# Patient Record
Sex: MALE | Race: WHITE | NOT HISPANIC OR LATINO | Employment: OTHER | URBAN - METROPOLITAN AREA
[De-identification: names, ages, dates, MRNs, and addresses within clinical notes are randomized per-mention and may not be internally consistent; named-entity substitution may affect disease eponyms.]

---

## 2019-07-10 ENCOUNTER — TELEPHONE (OUTPATIENT)
Dept: DERMATOLOGY | Facility: CLINIC | Age: 74
End: 2019-07-10

## 2019-08-05 ENCOUNTER — TELEPHONE (OUTPATIENT)
Dept: DERMATOLOGY | Facility: CLINIC | Age: 74
End: 2019-08-05

## 2019-08-05 NOTE — TELEPHONE ENCOUNTER
Telephone Call to pt   LVM to please return my call to scheduled a appointment with Dr Getachew Quinn

## 2019-09-16 ENCOUNTER — OFFICE VISIT (OUTPATIENT)
Dept: DERMATOLOGY | Facility: CLINIC | Age: 74
End: 2019-09-16
Payer: MEDICARE

## 2019-09-16 VITALS — HEIGHT: 68 IN | TEMPERATURE: 98.9 F | WEIGHT: 206 LBS | BODY MASS INDEX: 31.22 KG/M2

## 2019-09-16 DIAGNOSIS — L81.3 CAFE AU LAIT SPOTS: ICD-10-CM

## 2019-09-16 DIAGNOSIS — L57.8 ACTINIC SKIN DAMAGE: Primary | ICD-10-CM

## 2019-09-16 DIAGNOSIS — D22.9 MULTIPLE MELANOCYTIC NEVI: ICD-10-CM

## 2019-09-16 DIAGNOSIS — R21 RASH: ICD-10-CM

## 2019-09-16 DIAGNOSIS — L82.1 SEBORRHEIC KERATOSIS: ICD-10-CM

## 2019-09-16 DIAGNOSIS — D18.01 CHERRY ANGIOMA: ICD-10-CM

## 2019-09-16 PROCEDURE — 88312 SPECIAL STAINS GROUP 1: CPT | Performed by: PATHOLOGY

## 2019-09-16 PROCEDURE — 99204 OFFICE O/P NEW MOD 45 MIN: CPT | Performed by: DERMATOLOGY

## 2019-09-16 PROCEDURE — 88305 TISSUE EXAM BY PATHOLOGIST: CPT | Performed by: PATHOLOGY

## 2019-09-16 PROCEDURE — 11104 PUNCH BX SKIN SINGLE LESION: CPT | Performed by: DERMATOLOGY

## 2019-09-16 RX ORDER — FLUOCINONIDE 0.5 MG/G
OINTMENT TOPICAL
Qty: 60 G | Refills: 0 | Status: SHIPPED | OUTPATIENT
Start: 2019-09-16

## 2019-09-16 RX ORDER — LISINOPRIL 10 MG/1
10 TABLET ORAL DAILY
COMMUNITY

## 2019-09-16 NOTE — PROGRESS NOTES
[address] [facility] Note     Patient Name: Demi Ribera  Encounter Date: 09/16/2019    Today's Chief Concerns:  [employer] Concern #1:  Full Body Exam    Concern #2: brown spots      Past Medical History:  Have you ever had or currently have any of the following medical conditions or treatments? · HIV/AIDS: No  · Hepatitis B: No  · Hepatitis C: No   · Diabetes: No  · Tuberculosis: No  · Biologic Therapy/Chemotherapy: No  · Organ or Bone Marrow Transplantation: No  · Radiation Treatment: No  · Cancer (If Yes, which types)- No      Have you ever had any of the following skin conditions? · Melanoma? (If Yes, please provide more detail)- No  · Basal Cell Carcinoma: No  · Squamous Cell Carcinoma: No  · Sebaceous Cell Carcinoma: No  · Merkel Cell Carcinoma: No  · Angiosarcoma: No  · Blistering Sunburns: YES  · Eczema: YES  · Psoriasis: No    Social History:    What is your current Smoking Status? Never smoker     What is/was your primary occupation? Retired     What are your hobbies/past-times? Spftball    Family history:  Do any of your "first degree relatives" (parent, brother, sister, or child) have any of the following conditions? · Melanoma? (If Yes, which relatives?) No  · Eczema: No  · Asthma: No  · Hay Fever/Seasonal Allergies: No  · Psoriasis: No  · Arthritis: No  · Thyroid Problems: No  · Lupus/Connective Tissue Disease: No  · Diabetes: No  · Stroke: No  · Blood Clots: No  · IBD/Crohn's/Ulcerative Colitis: No  · Vitiligo: No  · Scarring/Keloids: No  · Severe Acne: No  · Pancreatic Cancer: No  · Other known Skin Condition? If Yes, what condition and which relatives? No    Current Medications:    Current Outpatient Medications:     lisinopril (ZESTRIL) 10 mg tablet, Take 10 mg by mouth daily, Disp: , Rfl:     Specific Alerts:    Have you been seen by a [facility] Dermatologist in the last 3 years? No    Are you pregnant or planning to become pregnant?  N/A    Are you currently or planning to be nursing or breast feeding? N/A    No Known Allergies    May we call your Preferred Phone number to discuss your specific medical information? YES    May we leave a detailed message that includes your specific medical information? YES    Have you traveled outside of the [facility] in the past 3 months? No    Do you currently have a pacemaker or defibrillator? No    Do you have any artificial heart valves, joints, plates, screws, rods, stents, pins, etc? No   - If Yes, were any placed within the last 2 years? Do you require any medications prior to a surgical procedure? No   - If Yes, for which procedure? n/a   - If Yes, what medications to you require? n/a    Are you taking any medications that cause you to bleed more easily ("blood thinners") No    Have you ever experienced a rapid heartbeat with epinephrine? No    Have you ever been treated with "gold" (gold sodium thiomalate) therapy? No    [facility] can help with wrinkles, "laugh lines," facial volume loss, "double chin," "love handles," age spots, and more  Are you interested in learning today about some of the skin enhancement procedures that we offer? (If Yes, please provide more detail) No    Review of Systems:  Have you recently had or currently have any of the following?     · Fever or chills: No  · Night Sweats: No  · Headaches: No  · Weight Gain: {No  · Weight Loss: No  · Blurry Vision: No  · Nausea: No  · Vomiting: No  · Diarrhea: No  · Blood in Stool: No  · Abdominal Pain: No  · Itchy Skin: YES  · Painful Joints: No  · Swollen Joints: No  · Muscle Pain: No  · Irregular Mole: No  · Sun Burn: YES  · Dry Skin: YES  · Skin Color Changes: No  · Scar or Keloid: No  · Cold Sores/Fever Blisters: No  · Bacterial Infections/MRSA: No  · Anxiety: No  · Depression: No  · Suicidal or Homicidal Thoughts: No      PHYSICAL EXAM:      Was a chaperone (Derm Clinical Assistant) present for the entirety of the Physical Exam? YES    Did the Dermatology Team specifically ask and  the patient on the importance of a Full Skin Exam to be sure that nothing is missed clinically? YES    Did the patient request or accept a Full Skin Exam?  YES    Did the patient specifically refuse to have the areas "under-the-bra" examined by the Dermatologist? No    Did the patient specifically refuse to have the areas "under-the-underwear" examined by the Dermatologist? No      CONSTITUTIONAL:   Vitals:    09/16/19 1546   Temp: 98 9 °F (37 2 °C)   TempSrc: Tympanic   Weight: 93 4 kg (206 lb)   Height: 5' 8 4" (1 737 m)       PSYCH: Normal mood and affect  EYES: Normal conjunctiva  ENT: Normal lips and oral mucosa  CARDIOVASCULAR: No edema  RESPIRATORY: Normal respirations  HEME/LYMPH/IMMUNO:  No regional lymphadenopathy except as noted below in ASSESSMENT AND PLAN BY DIAGNOSIS    FULL ORGAN SYSTEM SKIN EXAM (SKIN)  Hair, Scalp, Ears, Face Normal except as noted below in Assessment   Neck, Cervical Chain Nodes Normal except as noted below in Assessment   Right Arm/Hand/Fingers Normal except as noted below in Assessment   Left Arm/Hand/Fingers Normal except as noted below in Assessment   Chest/Breasts/Axillae Viewed areas Normal except as noted below in Assessment   Abdomen, Umbilicus Normal except as noted below in Assessment   Back/Spine Normal except as noted below in Assessment   Groin/Genitalia/Buttocks Viewed areas Normal except as noted below in Assessment   Right Leg, Foot, Toes Normal except as noted below in Assessment   Left Leg, Foot, Toes Normal except as noted below in Assessment        ASSESSMENT AND PLAN BY DIAGNOSIS:    History of Present Condition:     Duration:  How long has this been an issue for you?    o  Years    Location Affected:  Where on the body is this affecting you?     o  Left elbow   Quality:  Is there any bleeding, pain, itch, burning/irritation, or redness associated with the skin lesion?    o  Itching Sratching   Severity: Describe any bleeding, pain, itch, burning/irritation, or redness on a scale of 1 to 10 (with 10 being the worst)  o  6   Timing:  Does this condition seem to be there pretty constantly or do you notice it more at specific times throughout the day?    o  Constantly   Context:  Have you ever noticed that this condition seems to be associated with specific activities you do?    o  Denies   Modifying Factors:    o Anything that seems to make the condition worse?    -  Denies  o What have you tried to do to make the condition better? -  Denies   Associated Signs and Symptoms:  Does this skin lesion seem to be associated with any of the following:  o  DERM ASSOCIATED SIGNS AND SYMPTOMS: Itching and Scratching     1  ACTINIC DAMAGE (Chronic Ultraviolet Radiation Exposure)    Physical Exam:   Anatomic Location Affected:  Trunk and extremities   Morphological Description:  Mottled (hyper- and hypo-pigmented), slightly atrophic skin with overlying telangiectasia   Pertinent Positives:   Pertinent Negatives: No lymphadenopathy    Assessment and Plan:  Based on a thorough discussion of this condition and the management approach to it (including a comprehensive discussion of the known risks, side effects and potential benefits of treatment), the patient (family) agrees to implement the following specific plan:   Start Neutrogena Daily Defense SPF 50+ at least 3 times a day      Photo-aging and actinic damage of skin is common on sites repeatedly exposed to the sun, especially the backs of the hands and the face, most often affecting the ears, nose, cheeks, upper lip, vermilion of the lower lip, temples, forehead and balding scalp  In severely chronically sun-exposed individuals, this condition may also be found on the upper trunk, upper and lower limbs, and dorsum of feet      Photo-aging induces cutaneous changes that vary among individuals, reflecting inherent differences in vulnerability to sun exposure and repair capacity  We discussed further steps to minimize or avoid UV exposure:     Be aware of daily UV index levels  In the [facility], this index is often reported on the [address]   Avoid outdoor activities during the middle of the day   Wear sun-protective clothing (e g , UPF-rated, broad-brimmed hats, long sleeves, and trousers or skirts)   Apply a high sun protection factor (60+) broad-spectrum sunscreen moisturizer at least three times a day to affected areas, year-round  I recommended Neutrogena Daily Defense or CeraVe AM or Aveeno   Do not smoke, and where possible, avoid exposure to pollutants   Get plenty of exercise -- active people appear younger than inactive people   Eat fruit and vegetables daily   Many oral supplements with antioxidant and anti-inflammatory properties have been advocated to mitigate skin aging and to improve skin health  These include carotenoids; polyphenols; chlorophyll; aloe vera; vitamins B, C, and E; red ginseng; squalene; and omega-3 fatty acids  Their role in combatting skin aging is unclear  2  MELANOCYTIC NEVI ("Moles")    Physical Exam:   Anatomic Location Affected:   Mostly on sun-exposed areas of the trunk    Morphological Description:  Scattered, 1-4mm round to ovid, symmetrical-appearing, even bordered, brown to dark brown macules/papules, mostly in sun-exposed areas     Melanocytic Nevi  Melanocytic nevi ("moles") are tan or brown, raised or flat areas of the skin which have an increased number of melanocytes  Melanocytes are the cells in our body which make pigment and account for skin color  Some moles are present at birth (I e , "congenital nevi"), while others come up later in life (i e , "acquired nevi")  The sun can stimulate the body to make more moles  Sunburns are not the only thing that triggers more moles  Chronic sun exposure can do it too       Clinically distinguishing a healthy mole from melanoma may be difficult, even for experienced dermatologists  The "ABCDE's" of moles have been suggested as a means of helping to alert a person to a suspicious mole and the possible increased risk of melanoma  The suggestions for raising alert are as follows:    Asymmetry: Healthy moles tend to be symmetric, while melanomas are often asymmetric  Asymmetry means if you draw a line through the mole, the two halves do not match in color, size, shape, or surface texture  Asymmetry can be a result of rapid enlargement of a mole, the development of a raised area on a previously flat lesion, scaling, ulceration, bleeding or scabbing within the mole  Any mole that starts to demonstrate "asymmetry" should be examined promptly by a board certified dermatologist      Border: Healthy moles tend to have discrete, even borders  The border of a melanoma often blends into the normal skin and does not sharply delineate the mole from normal skin  Any mole that starts to demonstrate "uneven borders" should be examined promptly by a board certified dermatologist      Color: Healthy moles tend to be one color throughout  Melanomas tend to be made up of different colors ranging from dark black, blue, white, or red  Any mole that demonstrates a color change should be examined promptly by a board certified dermatologist      Diameter: Healthy moles tend to be smaller than 0 6 cm in size; an exception are "congenital nevi" that can be larger  Melanomas tend to grow and can often be greater than 0 6 cm (1/4 of an inch, or the size of a pencil eraser)  This is only a guideline, and many normal moles may be larger than 0 6 cm without being unhealthy    Any mole that starts to change in size (small to bigger or bigger to smaller) should be examined promptly by a board certified dermatologist      Evolving: Healthy moles tend to "stay the same "  Melanomas may often show signs of change or evolution such as a change in size, shape, color, or elevation  Any mole that starts to itch, bleed, crust, burn, hurt, or ulcerate or demonstrate a change or evolution should be examined promptly by a board certified dermatologist       Dysplastic Nevi  Dysplastic moles are moles that fit the ABCDE rules of melanoma but are not identified as melanomas when examined under the microscope  They may indicate an increased risk of melanoma in that person  If there is a family history of melanoma, most experts agree that the person may be at an increased risk for developing a melanoma  Experts still do not agree on what dysplastic moles mean in patients without a personal or family history of melanoma  Dysplastic moles are usually larger than common moles and have different colors within it with irregular borders  The appearance can be very similar to a melanoma  Biopsies of dysplastic moles may show abnormalities which are different from a regular mole  Melanoma  Malignant melanoma is a type of skin cancer that can be deadly if it spreads throughout the body  The incidence of melanoma in the [place] is growing faster than any other cancer  Melanoma usually grows near the surface of the skin for a period of time, and then begins to grow deeper into the skin  Once it grows deeper into the skin, the risk of spread to other organs greatly increases  Therefore, early detection and removal of a malignant melanoma may result in a better chance at a complete cure; removal after the tumor has spread may not be as effective, leading to worse clinical outcomes such as death  The true rate of nevus transformation into a melanoma is unknown  It has been estimated that the lifetime risk for any acquired melanocytic nevus on any 21year-old individual transforming into melanoma by age [de-identified] is 0 03% (1 in 3,164) for men and 0 009% (1 in 10,800) for women  The appearance of a "new mole" remains one of the most reliable methods for identifying a malignant melanoma  Occasionally, melanomas appear as rapidly growing, blue-black, dome-shaped bumps within a previous mole or previous area of normal skin  Other times, melanomas are suspected when a mole suddenly appears or changes  Itching, burning, or pain in a pigmented lesion should increase suspicion, but most patients with early melanoma have no skin discomfort whatsoever  Melanoma can occur anywhere on the skin, including areas that are difficult for self-examination  Many melanomas are first noticed by other family members  Suspicious-looking moles may be removed for microscopic examination  You may be able to prevent death from melanoma by doing two simple things:    1  Try to avoid unnecessary sun exposure and protect your skin when it is exposed to the sun  People who live near the equator, people who have intermittent exposures to large amounts of sun, and people who have had sunburns in childhood or adolescence have an increased risk for melanoma  Sun sense and vigilant sun protection may be keys to helping to prevent melanoma  We recommend wearing UPF-rated sun protective clothing and sunglasses whenever possible and applying a moisturizer-sunscreen combination product (SPF 50+) such as Neutrogena Daily Defense to sun exposed areas of skin at least three times a day  2  Have your moles regularly examined by a board certified dermatologist AND by yourself or a family member/friend at home  We recommend that you have your moles examined at least once a year by a board certified dermatologist   Use your birthday as an annual reminder to have your "Birthday Suit" (I e , your skin) examined; it is a nice birthday gift to yourself to know that your skin is healthy appearing! Additionally, at-home self examinations may be helpful for detecting a possible melanoma  Use the ABCDEs we discussed and check your moles once a month at home        3  SEBORRHEIC KERATOSIS; NON-INFLAMED    Physical Exam:   Anatomic Location Affected:  trunk   Morphological Description:  Flat and raised, waxy, smooth to warty textured, yellow to brownish-grey to dark brown to blackish, discrete, "stuck-on" appearing papules  Additional History of Present Condition:  Patient reports new bumps on the skin  Denies itch, burn, pain, bleeding or ulceration  Present constantly; nothing seems to make it worse or better  No prior treatment  Assessment and Plan:  Based on a thorough discussion of this condition and the management approach to it (including a comprehensive discussion of the known risks, side effects and potential benefits of treatment), the patient (family) agrees to implement the following specific plan:   Use a moisturizer + sunscreen "combo" product such as Neutrogena Daily Defense SPF 50+ or CeraVe AM at least three times a day       Seborrheic Keratosis  A seborrheic keratosis is a harmless warty spot that appears during adult life as a common sign of skin aging  Seborrheic keratoses can arise on any area of skin, covered or uncovered, with the usual exception of the palms and soles  They do not arise from mucous membranes  Seborrheic keratoses can have highly variable appearance  Seborrheic keratoses are extremely common  It has been estimated that over 90% of adults over the age of 61 years have one or more of them  They occur in males and females of all races, typically beginning to erupt in the 35s or 45s  They are uncommon under the age of 21 years  The precise cause of seborrhoeic keratoses is not known  Seborrhoeic keratoses are considered degenerative in nature  As time goes by, seborrheic keratoses tend to become more numerous  Some people inherit a tendency to develop a very large number of them; some people may have hundreds of them      The name "seborrheic keratosis" is misleading, because these lesions are not limited to a seborrhoeic distribution (scalp, mid-face, chest, upper back), nor are they formed from sebaceous glands, nor are they associated with sebum -- which is greasy  Seborrheic keratosis may also be called "SK," "Seb K," "basal cell papilloma," "senile wart," or "barnacle "      Researchers have noted:   Eruptive seborrhoeic keratoses can follow sunburn or dermatitis   Skin friction may be the reason they appear in body folds   Viral cause (e g , human papillomavirus) seems unlikely   Stable and clonal mutations or activation of FRFR3, PIK3CA, PARISH, AKT1 and EGFR genes are found in seborrhoeic keratoses   Seborrhoeic keratosis can arise from solar lentigo   FRFR3 mutations also arise in solar lentigines  These mutations are associated with increased age and location on the head and neck, suggesting a role of ultraviolet radiation in these lesions   Seborrheic keratoses do not harbour tumour suppressor gene mutations   Epidermal growth factor receptor inhibitors, which are used to treat some cancers, often result in an increase in verrucal (warty) keratoses  There is no easy way to remove multiple lesions on a single occasion  Unless a specific lesion is "inflamed" and is causing pain or stinging/burning or is bleeding, most insurance companies do not authorize treatment  4  RASH    Physical Exam:   (Anatomic Location); (Size and Morphological Description); (Differential Diagnosis):  o Left posterior proximal forearm and bilateral lateral shoulders with pinkish purple granulomatis plaque  Differential Polymorpha light eruption, lichen planus versus  Other granuloma   Pertinent Positives:   Pertinent Negatives: No lymphadenopathy; normal nails; normal oral mucosa    Additional History of Present Condition:  Itchy  Only really on arms and shoulders  No treatment tried  Denies worsening in the sun      Assessment and Plan:  Based on a thorough discussion of this condition and the management approach to it (including a comprehensive discussion of the known risks, side effects and potential benefits of treatment), the patient (family) agrees to implement the following specific plan:   Recommend biopsyt o help guide diagnosis and management      PROCEDURE NOTE:  PUNCH BIOPSY    Performing Physician:Dr Delgado     Pre-operative Diagnosis: Rash    Location: Left posterior proximal forearm    Indications: To indicate diagnosis and management plan  Anesthesia: Lidocaine 1% with epinephrine    Procedure Details     Patient informed of the risks (including bleeding,scaring and infection) and benefits of the procedure explained  Verbal and written informed consent obtained  The area was prepped and draped in the usual fashion  Anesthesia was obtained with 1% lidocaine with epinephrine  The skin was then stretched perpendicular to the skin tension lines and a punch biopsy to an appropriate sampling depth was obtained with a 4 mm punch with a forceps and iris scissors  Hemostasis was obtained with 5-0 Ethilon x 2 sutures  Complications:  None      Specimen has been sent for review by Dermatopathology  Plan:  1  Instructed to keep the wound dry and covered for 24-48h and clean thereafter  2  Warning signs of infection were reviewed  3  Recommended that the patient use acetaminophen as needed for pain  4  Sutures if any should be removed in 10-14 days      Standard post-procedure care has been explained and has been included in written form within the patient's copy of Informed Consent  5  BOLANOS ANGIOMAS    Physical Exam:   Anatomic Location Affected:  Trunk   Morphological Description:  Scattered cherry red, 1-4 mm papules   Pertinent Positives:   Pertinent Negatives: Additional History of Present Condition:  Denies itching or bleeding      Assessment and Plan:  Based on a thorough discussion of this condition and the management approach to it (including a comprehensive discussion of the known risks, side effects and potential benefits of treatment), the patient (family) agrees to implement the following specific plan:   Use a moisturizer + sunscreen "combo" product such as Neutrogena Daily Defense SPF 50+ or CeraVe AM at least three times a day  Assessment and Plan:    Cherry angioma, also known as [employer] spots, are benign vascular skin lesions  A "cherry angioma" is a firm red, blue or purple papule, 0 1-1 cm in diameter  When thrombosed, they can appear black in colour until evaluated with a dermatoscope when the red or purple colour is more easily seen  Cherry angioma may develop on any part of the body but most often appear on the scalp, face, lips and trunk  An angioma is due to proliferating endothelial cells; these are the cells that line the inside of a blood vessel  Angiomas can arise in early life or later in life; the most common type of angioma is a cherry angioma  Cherry angiomas are very common in males and females of any age or race  They are more noticeable in white skin than in skin of colour  They markedly increase in number from about the age of 36  There may be a family history of similar lesions  Eruptive cherry angiomas have been rarely reported to be associated with internal malignancy  The cause of angiomas is unknown  Genetic analysis of cherry angiomas has shown that they frequently carry specific somatic missense mutations in the GNAQ and GNA11 (Q209H) genes, which are involved in other vascular and melanocytic proliferations  Cherry angioma is usually diagnosed clinically and no investigations are necessary for the majority of lesions  It has a characteristic red-clod or lobular pattern on dermatoscopy (called lacunar pattern using conventional pattern analysis)  When there is uncertainty about the diagnosis, a biopsy may be performed  The angioma is composed of venules in a thickened papillary dermis  Collagen bundles may be prominent between the lobules      Cherry angiomas are harmless, so they do not usually have to be treated  Occasionally, they are removed to exclude a malignant skin lesion such as a nodular melanoma or because they are irritated or bleeding (and a subsequent risk for infection)  To decrease friction over the lesions, we recommend Neutrogena Daily Defense SPF 50+ at least 3 times a day  6  CAFE AU LAIT MACULE    Physical Exam:   Anatomic Location Affected:  Left medial buttock   Morphological Description:  Light tan discrete patch   Pertinent Positives:   Pertinent Negatives: Additional History of Present Condition:  Congenital       Assessment and Plan:  Based on a thorough discussion of this condition and the management approach to it (including a comprehensive discussion of the known risks, side effects and potential benefits of treatment), the patient (family) agrees to implement the following specific plan:   Nothing to do clinically  What is a café-au-lait macule? A café-au-lait macule is a common birthmark, presenting as a hyperpigmented skin patch with a sharp border and diameter of > 0 5 cm  It is also known as circumscribed café-au-lait hypermelanosis, von Recklinghausen spot, or abbreviated as 'CALM'  Who gets café-au-lait macules? Café-au-lait macules are usually present at birth (congenital) or appear in early infancy  They sometimes become apparent later in infancy, especially after exposure to the sun, which darkens the color  They may be isolated or associated with systemic diseases such as neurofibromatosis (NF), Veronica Brannon syndrome, Legius syndrome, and Gabino syndrome with multiple lentigines syndrome  The overall prevalence of café-au-lait macules varies with race   0 3% of Caucasians   0 4% of Chinese   3% of Hispanics   18% of  Americans  Isolated café-au-lait macules are invariably solitary   More than 3 in a  or more than 5 in an  are uncommon and should lead to systemic evaluation, referral and close follow-up  What causes café-au-lait macules? The brown color of a café-au-lait macule is due to a pigment called melanin, which is produced in the skin by cells called melanocytes   The epidermal melanocytes of an isolated café-au-lait macule have excessive numbers of melanosomes (intracellular pigment granules)  This is known as epidermal melanotic hypermelanosis   The café-au-lait macules associated with NF type 1 and Leopard syndrome have increased proliferation of epidermal melanocytes (epidermal melanocytic hyperplasia)    A café-au-lait macules is not classified as a congenital melanocytic naevus  Multiple café-au-lait macules are related to several genetic syndromes  Neurofibromatosis type 1   About half of those with neurofibromatosis type 1 (NF1) have an inherited mutation of the NF1 gene on chromosome 16  NF1 codes for neurofibromin, a tumor suppressor gene  Others have a sporadic mutation of the same gene  Neurofibromatosis type 2   Like NF1, autosomal dominant and sporadic mutations of the NF2 gene are equally common  NF2 gene codes for Merlin protein, whose physiologic function is still under investigation  Legius syndrome   Legius syndrome is caused by SPRED gene mutation, which generally controls the PARISH pathway and interacts with neurofibromin  Veronica Shungnak syndrome   Veronica Shungnak syndrome is caused by mutation of Gs protein, activating adenylate cyclase  [place] syndrome with multiple lentigines   Gabino syndrome with multiple lentigines is due to the autosomal dominant inheritance of mutated PTPN11 gene on chromosome 12  The gene codes protein tyrosine phosphatase SHP-2  The next three syndromes are much rarer than those described above  Irvin syndrome   Irvin syndrome is linked to mutation of NF1 gene, or is at least allelic to NF1, or is caused by mutation of contiguous genes to NF1      Bloom syndrome   Bloom syndrome is due to the autosomal recessive mutation in BLM gene on chromosome 15  The gene product is DNA helicase, an enzyme essential to DNA repair to prevent chromosomal breakage  Silver-Luiz syndrome   There are several genetic abnormalities associated with Silver-Luiz syndrome  The 2 most common are:   The absence of methylation in the genetic imprinting process of H19 and IGF2 genes on chromosome 11  They are responsible for normal cell growth  This abnormality is found in 30% of cases of Silver-Luiz syndrome   Inheritance of both chromosome 7s from mother (maternal uniparental disomy)  What are the clinical features of café-au-lait macules? Café-au-lait macules:   Are light brown in color   The pigment is evenly distributed   They are well demarcated with a smooth or irregular border    Their shape is either round or oval     The distribution and configuration of café-au-lait macules can be a clue to an underlying syndrome  NF1  NF1 is highly variable in appearance  The main  [facility] [place] [address] ([facility]) Consensus criteria for the diagnosis of NF1 are:   6 or more café-au-lait macules with diameter > 5 mm in children and > 15 mm in adults  They may be on the trunk or extremities   Axillary or inguinal freckling  These are small café-au-lait macules and have the same microscopic appearance  There are 5 other NIH criteria:   Cutaneous neurofibromas (> 2) or a plexiform neurofibroma (> 1)  o Cutaneous neurofibromas are present in > 90% of adults with NF1  They are soft tumors that move with the skin, are not painful and do not have malignant potential   o Plexiform neurofibromas are found in 25% of NF1 patients  They are soft, painful, hyper pigmented plaques and sometimes have excessive hair (hypertrichosis)  If large enough, they can cause distortion of surrounding structures  Plexiform neurofibromas have the potential for malignant transformation     Lisch nodules on iris (> 2)   Optic pathway glioma   Osseous dysplasia: sphenoid dysplasia; thinning and bowing of long bone; pseudoarthrosis   First degree relatives diagnosed with NF 1 using these criteria    At least two criteria are required to make a working diagnosis of neurofibromatosis  The definitive diagnosis is made by confirming the presence of a genetic mutation  NF type 2  NF2 presents with unilateral or bilateral acoustic schwannoma (vestibular schwannoma)  Patients develop hearing problems, ringing in the ears (tinnitus), and dizziness  By the age of 27, nearly all patients with NF2 have bilateral vestibular schwannoma   Other nervous system tumors in NF2 include cranial and peripheral nerve schwannomas, meningiomas, ependymomas, and astrocytomas   60-80% of patients with NF2 suffer from presenile posterior subcapsular lenticular opacities (cataracts)   The main skin lesion arising in NF2 is an elastic, firm, well-demarcated subcutaneous neurilemmoma  Café-au-lait macules are less common  Legius syndrome  Patients with Legius syndrome have multiple café-au-lait macules (> 5mm in children and > 15 mm in adults)  Axillary freckling less common  They may rarely have macrocephaly, cognitive disabilities, and several congenital malformations such as Gabino-like facies, pectus excavatum/carinatum, and lipomas  Veronica Milmine syndrome  Café-au-lait macules in Veronica Brannon syndrome are fewer than in NF1, with more irregular borders  They are classically found on the midline  The clinical diagnosis of Luster Ace syndrome is established by a triad of abnormalities:   Polyostotic or monostotic fibrous dysplasia   Café-au-lait macules   Hyperfunctioning hormonal disorders such as precocious puberty, hyperthyroidism, hypercortisolism, hyperomatotropism, and hypophosphataemic rickets      [place] syndrome with multiple lentigines  [place] syndrome with multiple lentigines is also known as LEOPARD syndrome; the L of JUAN ROPARD syndrome refers to prominent lentigines  These are multiple < 5 mm, well-demarcated, brown macules presenting on the whole skin without mucous membrane involvement  They appear at birth and continue increasing in number until puberty  LEOPARD is an acronym referring to the clinical findings required to make the diagnosis:   Lentigines   Electrocardiogram conduction defects   Ocular hypertelorism   Pulmonary stenosis   Abnormalities of genitalia   Retardation of growth   Sensorineural deafness  Patients with Gabino syndrome with multiple lentigines may also develop café-au-lait macules, nail malformation, and hyperelastic skin  Irvin syndrome  Irvin syndrome is extremely rare with only 4 families described between the 65s to early 36s  Their café-au-lait macules in Irvin syndrome had similar characteristics to NF1  Other features of Irvin syndrome are:   Stenosis of the pulmonary valve   Mental retardation   Short stature   Relative macrocephaly   Lisch nodules on the iris   Neurofibromas  Bloom syndrome  Café-au-lait macules are not the main clinical finding in Bloom syndrome  Key characteristics of Bloom syndrome are:   Growth deficiency   Immunodeficiency   Malignancies   Predilection to diabetes   Distinctive narrow facies   High-pitched voice   Hypogonadism and/or infertility    Silver-Luiz syndrome  Even though café-au-lait macules are not essential for diagnosis, they are common in children with Silver-Luiz syndrome  They are mainly located on chest, stomach, and extremities  The main features of Silver-Luiz syndrome are:   Severe retardation of intrauterine and  growth   Relative macrocephaly   Small, triangular facies and prominent forehead   Other congenital malformations, including clinodactyly V, hemihypoplasia, micrognathia, and ear anomalies    How is a café-au-lait macule diagnosed? Café-au-lait macules are diagnosed clinically   If significant in number and size, a complete clinical examination should be undertaken to determine whether an associated syndrome may be present  Syndromes may be diagnosed from their clinical manifestations or by genetic testing  What is the treatment for café-au-lait macules? No medical care is required to treat café-au-lait macules  Lasers reported to have successfully faded café-au-lait macules include:   Pulsed-dye laser   Er:YAG laser   Q-switched Nd:YAG laser   Q-switched jimmie or alexandrite laser  Results are inconsistent  One group has found lesions with an irregular margin respond better than those with a smooth, well-defined border  Risks for laser surgery include transient/permanent hyperpigmentation, hypopigmentation, and scarring  Treatment of underlying syndromes may be complex and require multidisciplinary care  What is the outcome for café-au-lait macules? Without treatment, café-au-lait macules persist lifelong  Results from lasers are not consistent  However, for those who responded to initial treatment, recurrence rates are reported to be low

## 2019-09-27 NOTE — RESULT ENCOUNTER NOTE
DERMATOPATHOLOGY RESULT NOTE    Accession # or Case # (copied from Path Report):  Case: [id number]     Specimen Letter and Anatomic Location (copied from Path Report):  A  Skin Lesion, Left proximal posterior forearm, punch biopsy:    Histopathological Diagnosis:    A  Skin Lesion, Left proximal posterior forearm, punch biopsy:  - Spongiotic psoriasiform dermatitis  See Note  - Special stains for fungus (PAS) and bacteria (B&H) negative      Note: Multiple levels are examined revealing irregular psoriasiform epidermal hyperplasia with spongiosis and focal spongiotic vesicles, hyperkeratosis, focal parakeratosis, follicular plugging, subcorneal pustule and scale crust  There is papillary dermal fibrosis, minimal edema and a superficial perivascular to lichenoid inflammatory infiltrate of lymphocytes and eosinophils  A rare necrotic keratinocyte is seen  Special stains for organisms are negative  The histologic differential diagnosis includes a traumatized subacute to chronic phase of an eczematous dermatitis (i e allergic contact dermatitis and histologic simulator nummular dermatitis as well as some drug eruptions)  Polymorphous light eruption typically has more papillary dermal edema and no eosinophils  Although there is some lichenoid inflammation and a rare necrotic keratinocyte, other features typical of lichen planus are not appreciated  No granulomatous inflammation is present  Clinical correlation is required  Bere Price of Lesion/Condition:  BENIGN     Provider has personally called patient and relayed results and plan:  yes    Plan:  I spoke with patient and he reports that the rash has responded well to topical steroids; I have asked that he use the steroids Mondays through Fridays twice a day for an additional 6 weeks to his arms  Please call this patient to set up a follow-up appointment for about 4-6 weeks from now  Thanks!     Tissue Exam: [id number]   Order: [id number]   Status:  Final result   Visible to patient:  No (Not Released) Dx:  Rash   Component   Case Report  Surgical Pathology Report                         Case: [id number]                                   Authorizing Provider: Ghazala Barragan MD     Collected:           09/16/2019 1719              Ordering Location:     [facility]      Received:            09/16/2019 1719                                     [facility]                                                                Pathologist:           Samual Ormond, MD                                                             Specimen:    Lesion, Left proximal posterior forearm                                                  Final Diagnosis  A  Skin Lesion, Left proximal posterior forearm, punch biopsy:  - Spongiotic psoriasiform dermatitis  See Note  - Special stains for fungus (PAS) and bacteria (B&H) negative      Note: Multiple levels are examined revealing irregular psoriasiform epidermal hyperplasia with spongiosis and focal spongiotic vesicles, hyperkeratosis, focal parakeratosis, follicular plugging, subcorneal pustule and scale crust  There is papillary dermal fibrosis, minimal edema and a superficial perivascular to lichenoid inflammatory infiltrate of lymphocytes and eosinophils  A rare necrotic keratinocyte is seen  Special stains for organisms are negative  The histologic differential diagnosis includes a traumatized subacute to chronic phase of an eczematous dermatitis (i e allergic contact dermatitis and histologic simulator nummular dermatitis as well as some drug eruptions)  Polymorphous light eruption typically has more papillary dermal edema and no eosinophils  Although there is some lichenoid inflammation and a rare necrotic keratinocyte, other features typical of lichen planus are not appreciated  No granulomatous inflammation is present   Clinical correlation is required        Interpretation performed at [address] [facility] [address]  Electronically signed by Irasema Gong MD on 9/23/2019 at  5:49 PM  Additional Information   All controls performed with the immunohistochemical stains reported above reacted appropriately  These tests were developed and their performance characteristics determined by [facility] or [facility]  They may not be cleared or approved by the [place]  Food and Drug Administration  The FDA has determined that such clearance or approval is not necessary  These tests are used for clinical purposes  They should not be regarded as investigational or for research  This laboratory has been approved by [facility], designated as a high-complexity laboratory and is qualified to perform these tests  Gross Description   A  The specimen is received in formalin, labeled with the patient's name and hospital number, and is designated "punch biopsy left proximal posterior forearm"  The specimen consists of a tan portion of skin measuring 0 4 cm in diameter, excised to a depth of 0 4 cm  The skin surface appears keratotic  The margin of resection is inked green  The skin surface is inked red  The specimen is bisected  Entirely submitted  One cassette  Between sponges      Note: The estimated total formalin fixation time based upon information provided by the submitting clinician and the standard processing schedule is under 72 hours  MCrites     Clinical Information   SPECIMEN A; Skin; 4mm Punch Biopsy; Left proximal posterior forearm; 70 yo male with pinkish-purple granulomatous papules on arms and shoulders   Diff Dx:  Polymorphous light eruption versus Lichen planus versus Granulomatous conditions (sarcoid)    Resulting Agency [id number]      Specimen Collected: 09/16/19  5:19 PM Last Resulted: 09/23/19  5:49 PM

## 2021-03-03 DIAGNOSIS — Z23 ENCOUNTER FOR IMMUNIZATION: ICD-10-CM

## 2021-05-04 ENCOUNTER — IMMUNIZATIONS (OUTPATIENT)
Dept: FAMILY MEDICINE CLINIC | Facility: HOSPITAL | Age: 76
End: 2021-05-04

## 2021-05-04 DIAGNOSIS — Z23 ENCOUNTER FOR IMMUNIZATION: Primary | ICD-10-CM

## 2021-05-04 PROCEDURE — 91300 SARS-COV-2 / COVID-19 MRNA VACCINE (PFIZER-BIONTECH) 30 MCG: CPT

## 2021-05-04 PROCEDURE — 0001A SARS-COV-2 / COVID-19 MRNA VACCINE (PFIZER-BIONTECH) 30 MCG: CPT

## 2021-06-01 ENCOUNTER — IMMUNIZATIONS (OUTPATIENT)
Dept: FAMILY MEDICINE CLINIC | Facility: HOSPITAL | Age: 76
End: 2021-06-01

## 2021-06-01 DIAGNOSIS — Z23 ENCOUNTER FOR IMMUNIZATION: Primary | ICD-10-CM

## 2021-06-01 PROCEDURE — 91300 SARS-COV-2 / COVID-19 MRNA VACCINE (PFIZER-BIONTECH) 30 MCG: CPT

## 2021-06-01 PROCEDURE — 0002A SARS-COV-2 / COVID-19 MRNA VACCINE (PFIZER-BIONTECH) 30 MCG: CPT
